# Patient Record
Sex: FEMALE | Race: WHITE | NOT HISPANIC OR LATINO | URBAN - METROPOLITAN AREA
[De-identification: names, ages, dates, MRNs, and addresses within clinical notes are randomized per-mention and may not be internally consistent; named-entity substitution may affect disease eponyms.]

---

## 2021-04-30 ENCOUNTER — CLINICAL SUPPORT (OUTPATIENT)
Dept: GENETICS | Facility: CLINIC | Age: 26
End: 2021-04-30

## 2021-04-30 DIAGNOSIS — Z84.81 FAMILY HISTORY OF GENETIC DISEASE CARRIER: Primary | ICD-10-CM

## 2021-04-30 NOTE — PROGRESS NOTES
Pre-Test Genetic Counseling Consult Note    Patient Name: Jacinto Rojas   /Age: 1995/ y o  Referring Provider: Self-referred    Date of Service: 2021  Genetic Counselor: Lawyer Chester MS, [facility]  Interpretation Services: None  Location: Telephone consult   Length of Visit: 60 minutes      Terry Collins was referred to the [address] [place] [address] and Genetic Assessment Program due to her familial BRCA2 mutation  She presents today to discuss the possibility of a hereditary cancer syndrome, options for genetic testing, and implications for her and her family  Cancer History and Treatment:     Personal History: No personal history of cancer    Screening Hx:   Breast:  Clinical breast exam: Routine   Mammogram: None   Breast biopsy: None     Colon:  Colonoscopy: None     Gynecologic:  Pelvic/Pap exam: Routine   Ovaries/Uterus: Intact    Skin:  Skin cancer screening: Not routinely at this time     Medical and Surgical History  Pertinent surgical history: No past surgical history on file  Pertinent medical history:No past medical history on file  Other History:  Height:   Ht Readings from Last 1 Encounters:   No data found for Ht     Weight:   Wt Readings from Last 1 Encounters:   No data found for Wt     Relevant Family History   Patient reports no Ashkenazi Mandaen ancestry  - Father: Age 63's with NHL    - Mother: No history of cancer; she underwent single site testing for the familial BRCA2 mutation and was identified to carry the BRCA2 c 3635delA pathogenic variant  - Maternal Aunt: Age 63's with breast cancer in her 42's and pancreatic cancer in her 63's; she underwent genetic testing and was found to carry a BRCA2 c 3635delA pathogenic variant  - Maternal Grandfather: Stomach cancer    Please refer to the scanned pedigree in the Media Tab for a complete family history     *All history is reported as provided by the patient; records are not available for review, except where indicated  Assessment:  1  Meets NCCN V2 2021 Testing Criteria for High-Penetrance Breast and/or Ovarian Cancer Susceptibility Genes   - Individuals with any blood relative with a known pathogenic/likely pathogenic variant in a cancer susceptibility gene  Specifically, Michelle's mother and maternal aunt carry a BRCA2 c 3635delA pathogenic variant        We explained that the likelihood that Sukhjinder Schmidt harbors the same BRCA2 pathogenic variant identified in her mother and aunt is 50%  We reviewed the cancer risks associated with BRCA2 pathogenic variants as well as the NCCN screening recommendations and risk-reduction options available if Sukhjinder Schmidt tests positive for the familial variant  Genetic testing for hereditary cancer is available via single gene analysis or panel testing of multiple genes associated with an increased risk of cancer  Michelle's paternal family history is not concerning for hereditary cancer therefore single site analysis is recommended  The risks, benefits, and limitations of genetic testing were reviewed with the patient, as well as genetic discrimination laws, and possible test results (positive or negative) and their clinical implications  Sukhjinder Schmidt decided to proceed with testing and provided consent  Plan:   Summary: Genetic testing is indicated for Sukhjinder Schmidt  Sample Collection: A saliva kit was mailed home to the patient  Genetic Testing Preformed: Single Site Testing for the BRCA2 c 3635delA pathogenic variant through [facility]'s free family member testing program    Genetic Testing Lab: [facility]    Results take approximately 2-3 weeks to complete once test is started  We will contact Sukhjinder Schmidt once results are available

## 2021-05-27 ENCOUNTER — TELEPHONE (OUTPATIENT)
Dept: GENETICS | Facility: CLINIC | Age: 26
End: 2021-05-27

## 2021-05-27 NOTE — TELEPHONE ENCOUNTER
Post-Test Genetic Counseling Consult Note  Today I spoke with Terry Karina over the phone to review the results of her genetic test for hereditary cancer  We met previously on 4/30/2021 for pre-test counseling  SUMMARY:    Test(s): Single Site Testing for the familial BRCA2 c 3635delA pathogenic variant through [facility]'s free family member testing program     Result: Positive     Variant 1: BRCA2 c 3635delA, heterozygous, pathogenic    Assessment:     The BRCA2 gene is associated with autosomal dominant hereditary breast and ovarian cancer (HBOC) syndrome (Sandeep Gracia: [id number]) and autosomal recessive Fanconi anemia, type D1 (FA-D1) (Sandeep Gracia: [id number])  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  Hereditary breast and ovarian cancer (HBOC) syndrome    Women with a single BRCA2 pathogenic variant have approximately a 40-85% lifetime risk of breast cancer  The risk for a second primary breast cancer within 5 years of the first breast cancer diagnosis is approximately 6 8-9%  Women also have up to a 17-27% lifetime risk for ovarian, fallopian tube, or peritoneal cancer to age 79  Men with a BRCA2 pathogenic variant have a 7-8% risk for breast cancer and a 20% risk for prostate cancer  Men and women also have an elevated risk for melanoma and pancreatic cancer although the exact lifetime risk is not known  Reproductive Risks:  Individuals with a single BRCA2 pathogenic variant may also be at risk to have a child with Fanconi Anemia if their partner also carries a BRCA2 variant  Fanconi anemia is characterized by bone marrow failure, short stature, abnormal skin pigmentation, developmental delay and malformations of the thumbs, skeletal and central nervous systems  Individuals with Fanconi anemia also have an increased risk for leukemia and early onset solid tumors    In some cases, it may be appropriate for a partner to undergo genetic testing to better understand the risk of having a child with this condition  If there is concern for reproductive risk, this information should be discussed with a health care provider or genetic counselor  Risks for Family Members: This test result may help clarify the risk for other family members to develop cancer  All first-degree relatives (parents, siblings and children) have up to a 50% chance of having the pathogenic variant  Other relatives such as aunts, uncles and cousins may also be at risk  We discussed that each of Cherelle Quiroz 's children have a 50% chance to inherit this BRCA2 pathogenic variant however testing is not recommended for children under the age of 25, as there are no childhood cancer risks known to be associated with a single pathogenic variant in this gene  We encouraged Cherelle Quiroz  to discuss this information with her relatives  If Cherelle Quiroz family members have any questions or are interested in testing they can reach out to the main Genetics number at [phone] for additional information  Additional Information:  A healthy lifestyle will improve overall health and reduce risk for illness  Eating a healthy diet and exercising for 4 hours per week is recommended  Both diet and exercise have been shown to help maintain a healthy weight  Postmenopausal women who are overweight are at higher risk for breast cancer  Moderate to heavy alcohol use can increase the risk for some cancers  Smoking cigarettes can also increase risk for breast, lung, prostate, pancreatic and other cancers  Management:  Management guidelines for individuals with pathogenic and likely pathogenic BRCA2 variants have been developed by the [facility]  Please refer to the current NCCN guidelines for the most up to date guidelines    The recommendations listed below are specific to Cherelle Quiroz and are based on recommendations in the V2 2021 The Genetic/Familial High-Risk Assessment: Breast, Ovarian and Pancreatic Guideline  These recommendations are subject to change over time and the newest guidelines should be referenced for the most up to date recommendations  Plan:    WOMEN  Breast Cancer Screening  -Breast awareness starting at age 25 y  -Clinical breast exam every 6-12 mo, starting at age 22 y     -Age 20-27 y, annual breast MRI screening with contrast (or mammogram with consideration of tomosynthesis, only if MRI is unavailable) or individualized based on family history if a breast cancer diagnosis before 27 is present     -Age 34-75y, annual mammogram with consideration of tomosynthesis and breast MRI screening with contrast    -Age ? 76 y, management should be considered on an individual basis     -Discuss option of risk-reducing mastectomy    -For women with a BRCA pathogenic/likely pathogenic variant who are treated for breast cancer and have not had a bilateral mastectomy, screening with annual mammogram and breast MRI should continue as described above  Gynecologic Cancer Screening  Ovarian Cancer  -Recommend risk-reducing salpingo-oophorectomy (RRSO), typically between 28 and 40y, and upon completion of child bearing  See Risk-Reducing Salpingo-Oophorectomy (RRSO) Protocol in NCCN Guidelines for Ovarian Cancer - Principles of Surgery     -Because onset of ovarian cancer in individuals with BRCA2 pathogenic/likely pathogenic variants is an average 8-10 years later than individuals with pathogenic/likely pathogenic BRCA1 variants it's reasonable to delay RRSO for management of ovarian cancer risk until 40-45y in patients with BRCA2 pathogenic/likely pathogenic variants unless age at diagnosis in the family warrants earlier age for consideration of prophylactic surgery     -Salpingectomy alone is not the standard of care for risk reduction, although clinical trials of interval salpingectomy and delayed oophorectomy are ongoing   The concern for risk-reducing salpingectomy alone is that women are still at risk for developing ovarian cancer  In addition, in premenopausal women, oophorectomy likely reduces the risk of developing breast cancer but the magnitude is uncertain and may be gene-specific     -For those patients who have not elected RRSO, transvaginal ultrasound combined with serum CA-125 for ovarian cancer screening, although of uncertain benefit, may be considered at the clinician's discretion starting at age 32-31 y  Skin Cancer Screening  -No specific screening guidelines exist for melanoma, but general melanoma risk management is appropriate, such as annual full-body skin examination and minimizing UV exposure  Pancreatic Cancer Screening  - Consider pancreatic cancer screening beginning at age 48 y (or 8 y younger than the earliest exocrine pancreatic cancer diagnosis in the family, whichever is earlier) for individuals with exocrine pancreatic cancer in >1 first- or second-degree relatives from the same side of the family as the identified pathogenic/likely pathogenic germline variant    - For individuals considering pancreatic cancer screening, the panel recommends that screening be performed in experienced high-volume centers, ideally under research conditions  The panel recommends that such screening only take place after an in-depth discussion about the potential limitations to screening, including cost, the high incidence of pancreatic abnormalities, and uncertainties about the potential benefits of pancreatic cancer screening    - The panel recommends that screening be considered using annual contrast-enhanced MRI/MRCP and/or EUD, with consideration of shorter screening intervals for individuals found to have worrisome abnormalities on screening  The panel emphasizes that most small cystic lesions found on screening will not warrant biopsy, surgical resection, or any other intervention      Other Screening:    Colon Cancer Screening  Population-based screening guidelines are appropriate      Summary:   Positive Result: Estuardo Panda was strongly encouraged to follow up on with our office on an annual basis to review the most up to date guidelines as recommendations are subject to change over time